# Patient Record
Sex: FEMALE | Race: NATIVE HAWAIIAN OR OTHER PACIFIC ISLANDER
[De-identification: names, ages, dates, MRNs, and addresses within clinical notes are randomized per-mention and may not be internally consistent; named-entity substitution may affect disease eponyms.]

---

## 2017-09-03 ENCOUNTER — HOSPITAL ENCOUNTER (EMERGENCY)
Dept: HOSPITAL 67 - ED | Age: 62
Discharge: HOME | End: 2017-09-03
Payer: COMMERCIAL

## 2017-09-03 ENCOUNTER — HOSPITAL ENCOUNTER (OUTPATIENT)
Dept: HOSPITAL 67 - AMB | Age: 62
Discharge: ANOTHER HOSPITAL | End: 2017-09-03
Payer: COMMERCIAL

## 2017-09-03 VITALS — SYSTOLIC BLOOD PRESSURE: 127 MMHG | DIASTOLIC BLOOD PRESSURE: 71 MMHG

## 2017-09-03 VITALS — HEIGHT: 67 IN | BODY MASS INDEX: 16.95 KG/M2 | TEMPERATURE: 98 F | WEIGHT: 108 LBS

## 2017-09-03 DIAGNOSIS — G40.89: Primary | ICD-10-CM

## 2017-09-03 DIAGNOSIS — I69.398: ICD-10-CM

## 2017-09-03 DIAGNOSIS — R56.9: Primary | ICD-10-CM

## 2017-09-03 DIAGNOSIS — G93.89: ICD-10-CM

## 2017-09-03 LAB
PLATELET # BLD: 232 K/UL (ref 152–353)
POTASSIUM SERPL-SCNC: 3.4 MMOL/L (ref 3.6–5.2)
SODIUM SERPL-SCNC: 140 MMOL/L (ref 136–145)

## 2017-09-03 PROCEDURE — 36415 COLL VENOUS BLD VENIPUNCTURE: CPT

## 2017-09-03 PROCEDURE — A0425 GROUND MILEAGE: HCPCS

## 2017-09-03 PROCEDURE — 80053 COMPREHEN METABOLIC PANEL: CPT

## 2017-09-03 PROCEDURE — 85027 COMPLETE CBC AUTOMATED: CPT

## 2017-09-03 PROCEDURE — A0429 BLS-EMERGENCY: HCPCS

## 2017-09-03 PROCEDURE — 99283 EMERGENCY DEPT VISIT LOW MDM: CPT

## 2017-09-05 ENCOUNTER — HOSPITAL ENCOUNTER (OUTPATIENT)
Dept: HOSPITAL 67 - AMB | Age: 62
Discharge: ANOTHER HOSPITAL | End: 2017-09-05
Payer: COMMERCIAL

## 2017-09-05 DIAGNOSIS — R19.7: ICD-10-CM

## 2017-09-05 DIAGNOSIS — R11.2: Primary | ICD-10-CM

## 2017-09-05 PROCEDURE — A0425 GROUND MILEAGE: HCPCS

## 2017-09-05 PROCEDURE — A0429 BLS-EMERGENCY: HCPCS

## 2017-09-06 ENCOUNTER — HOSPITAL ENCOUNTER (OUTPATIENT)
Dept: HOSPITAL 67 - AMB | Age: 62
Discharge: ANOTHER HOSPITAL | End: 2017-09-06
Payer: COMMERCIAL

## 2017-09-06 DIAGNOSIS — R11.2: Primary | ICD-10-CM

## 2017-09-06 DIAGNOSIS — R53.1: ICD-10-CM

## 2017-09-06 PROCEDURE — A0427 ALS1-EMERGENCY: HCPCS

## 2017-09-06 PROCEDURE — A0425 GROUND MILEAGE: HCPCS

## 2017-10-11 ENCOUNTER — HOSPITAL ENCOUNTER (OUTPATIENT)
Dept: HOSPITAL 67 - LAB | Age: 62
Discharge: HOME | End: 2017-10-11
Attending: INTERNAL MEDICINE
Payer: COMMERCIAL

## 2017-10-11 DIAGNOSIS — N39.0: Primary | ICD-10-CM

## 2017-10-11 PROCEDURE — 81000 URINALYSIS NONAUTO W/SCOPE: CPT

## 2017-10-11 PROCEDURE — 87088 URINE BACTERIA CULTURE: CPT

## 2017-10-11 PROCEDURE — 87077 CULTURE AEROBIC IDENTIFY: CPT

## 2017-10-11 PROCEDURE — 87086 URINE CULTURE/COLONY COUNT: CPT

## 2017-10-11 PROCEDURE — 87186 SC STD MICRODIL/AGAR DIL: CPT

## 2018-01-19 ENCOUNTER — HOSPITAL ENCOUNTER (EMERGENCY)
Dept: HOSPITAL 67 - ED | Age: 63
Discharge: HOME | End: 2018-01-19
Payer: COMMERCIAL

## 2018-01-19 ENCOUNTER — HOSPITAL ENCOUNTER (OUTPATIENT)
Dept: HOSPITAL 67 - AMB | Age: 63
Discharge: ANOTHER HOSPITAL | End: 2018-01-19
Payer: COMMERCIAL

## 2018-01-19 VITALS — BODY MASS INDEX: 26.68 KG/M2 | WEIGHT: 170 LBS | HEIGHT: 67 IN

## 2018-01-19 VITALS — DIASTOLIC BLOOD PRESSURE: 80 MMHG | SYSTOLIC BLOOD PRESSURE: 136 MMHG

## 2018-01-19 VITALS — TEMPERATURE: 98 F

## 2018-01-19 DIAGNOSIS — I73.89: Primary | ICD-10-CM

## 2018-01-19 DIAGNOSIS — L08.89: ICD-10-CM

## 2018-01-19 DIAGNOSIS — L97.528: ICD-10-CM

## 2018-01-19 DIAGNOSIS — D63.8: ICD-10-CM

## 2018-01-19 DIAGNOSIS — E88.09: ICD-10-CM

## 2018-01-19 DIAGNOSIS — I10: ICD-10-CM

## 2018-01-19 DIAGNOSIS — E87.6: ICD-10-CM

## 2018-01-19 DIAGNOSIS — R60.0: Primary | ICD-10-CM

## 2018-01-19 LAB
PLATELET # BLD: 209 K/UL (ref 152–353)
POTASSIUM SERPL-SCNC: 3.2 MMOL/L (ref 3.6–5.2)
SODIUM SERPL-SCNC: 133 MMOL/L (ref 136–145)

## 2018-01-19 PROCEDURE — 87205 SMEAR GRAM STAIN: CPT

## 2018-01-19 PROCEDURE — 85027 COMPLETE CBC AUTOMATED: CPT

## 2018-01-19 PROCEDURE — 82550 ASSAY OF CK (CPK): CPT

## 2018-01-19 PROCEDURE — 87186 SC STD MICRODIL/AGAR DIL: CPT

## 2018-01-19 PROCEDURE — A0427 ALS1-EMERGENCY: HCPCS

## 2018-01-19 PROCEDURE — 81000 URINALYSIS NONAUTO W/SCOPE: CPT

## 2018-01-19 PROCEDURE — 80184 ASSAY OF PHENOBARBITAL: CPT

## 2018-01-19 PROCEDURE — 84484 ASSAY OF TROPONIN QUANT: CPT

## 2018-01-19 PROCEDURE — A0425 GROUND MILEAGE: HCPCS

## 2018-01-19 PROCEDURE — 87077 CULTURE AEROBIC IDENTIFY: CPT

## 2018-01-19 PROCEDURE — 36415 COLL VENOUS BLD VENIPUNCTURE: CPT

## 2018-01-19 PROCEDURE — 87070 CULTURE OTHR SPECIMN AEROBIC: CPT

## 2018-01-19 PROCEDURE — 80053 COMPREHEN METABOLIC PANEL: CPT

## 2018-01-19 PROCEDURE — 80156 ASSAY CARBAMAZEPINE TOTAL: CPT

## 2018-01-19 PROCEDURE — 99283 EMERGENCY DEPT VISIT LOW MDM: CPT

## 2018-01-21 ENCOUNTER — HOSPITAL ENCOUNTER (EMERGENCY)
Dept: HOSPITAL 67 - ED | Age: 63
Discharge: HOME | End: 2018-01-21
Payer: COMMERCIAL

## 2018-01-21 ENCOUNTER — HOSPITAL ENCOUNTER (OUTPATIENT)
Dept: HOSPITAL 67 - AMB | Age: 63
Discharge: ANOTHER HOSPITAL | End: 2018-01-21
Payer: COMMERCIAL

## 2018-01-21 VITALS — TEMPERATURE: 98.2 F | SYSTOLIC BLOOD PRESSURE: 158 MMHG | DIASTOLIC BLOOD PRESSURE: 84 MMHG

## 2018-01-21 VITALS — HEIGHT: 67 IN | WEIGHT: 160 LBS | BODY MASS INDEX: 25.11 KG/M2

## 2018-01-21 DIAGNOSIS — Z87.81: ICD-10-CM

## 2018-01-21 DIAGNOSIS — R53.1: ICD-10-CM

## 2018-01-21 DIAGNOSIS — S90.31XA: ICD-10-CM

## 2018-01-21 DIAGNOSIS — M79.671: ICD-10-CM

## 2018-01-21 DIAGNOSIS — L03.116: ICD-10-CM

## 2018-01-21 DIAGNOSIS — S90.32XA: Primary | ICD-10-CM
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
## 2018-01-21 DIAGNOSIS — M79.672: Primary | ICD-10-CM
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
## 2018-01-21 PROCEDURE — A0425 GROUND MILEAGE: HCPCS

## 2018-01-21 PROCEDURE — A0429 BLS-EMERGENCY: HCPCS

## 2018-01-21 PROCEDURE — 99282 EMERGENCY DEPT VISIT SF MDM: CPT

## 2018-02-07 ENCOUNTER — HOSPITAL ENCOUNTER (OUTPATIENT)
Dept: HOSPITAL 67 - AMB | Age: 63
Discharge: ANOTHER HOSPITAL | End: 2018-02-07
Payer: COMMERCIAL

## 2018-02-07 DIAGNOSIS — R56.9: Primary | ICD-10-CM

## 2018-02-07 PROCEDURE — A0427 ALS1-EMERGENCY: HCPCS

## 2018-02-07 PROCEDURE — A0425 GROUND MILEAGE: HCPCS

## 2018-02-08 ENCOUNTER — HOSPITAL ENCOUNTER (OUTPATIENT)
Dept: HOSPITAL 67 - ED | Age: 63
Setting detail: OBSERVATION
LOS: 1 days | Discharge: HOME | End: 2018-02-09
Attending: FAMILY MEDICINE | Admitting: GENERAL PRACTICE
Payer: COMMERCIAL

## 2018-02-08 VITALS — TEMPERATURE: 98 F | SYSTOLIC BLOOD PRESSURE: 125 MMHG | DIASTOLIC BLOOD PRESSURE: 74 MMHG

## 2018-02-08 VITALS
BODY MASS INDEX: 22.77 KG/M2 | HEIGHT: 67 IN | WEIGHT: 145.06 LBS | HEIGHT: 67 IN | WEIGHT: 145.06 LBS | BODY MASS INDEX: 22.77 KG/M2

## 2018-02-08 VITALS — SYSTOLIC BLOOD PRESSURE: 180 MMHG | DIASTOLIC BLOOD PRESSURE: 98 MMHG | TEMPERATURE: 97.8 F

## 2018-02-08 VITALS — DIASTOLIC BLOOD PRESSURE: 68 MMHG | SYSTOLIC BLOOD PRESSURE: 123 MMHG | TEMPERATURE: 98.4 F

## 2018-02-08 VITALS — DIASTOLIC BLOOD PRESSURE: 70 MMHG | TEMPERATURE: 97.5 F | SYSTOLIC BLOOD PRESSURE: 142 MMHG

## 2018-02-08 VITALS — SYSTOLIC BLOOD PRESSURE: 127 MMHG | DIASTOLIC BLOOD PRESSURE: 57 MMHG | TEMPERATURE: 97.9 F

## 2018-02-08 VITALS — TEMPERATURE: 97.6 F | DIASTOLIC BLOOD PRESSURE: 83 MMHG | SYSTOLIC BLOOD PRESSURE: 174 MMHG

## 2018-02-08 VITALS — DIASTOLIC BLOOD PRESSURE: 87 MMHG | TEMPERATURE: 97.4 F | SYSTOLIC BLOOD PRESSURE: 134 MMHG

## 2018-02-08 DIAGNOSIS — I12.9: ICD-10-CM

## 2018-02-08 DIAGNOSIS — E87.6: ICD-10-CM

## 2018-02-08 DIAGNOSIS — Z86.73: ICD-10-CM

## 2018-02-08 DIAGNOSIS — N18.3: ICD-10-CM

## 2018-02-08 DIAGNOSIS — E83.51: ICD-10-CM

## 2018-02-08 DIAGNOSIS — R74.8: ICD-10-CM

## 2018-02-08 DIAGNOSIS — E87.1: ICD-10-CM

## 2018-02-08 DIAGNOSIS — G40.89: Primary | ICD-10-CM

## 2018-02-08 LAB
PLATELET # BLD: 274 K/UL (ref 152–353)
PLATELET # BLD: 304 K/UL (ref 152–353)
POTASSIUM SERPL-SCNC: 3.2 MMOL/L (ref 3.6–5.2)
POTASSIUM SERPL-SCNC: 4.5 MMOL/L (ref 3.6–5.2)
SODIUM SERPL-SCNC: 134 MMOL/L (ref 136–145)
SODIUM SERPL-SCNC: 139 MMOL/L (ref 136–145)

## 2018-02-08 PROCEDURE — 80156 ASSAY CARBAMAZEPINE TOTAL: CPT

## 2018-02-08 PROCEDURE — 96366 THER/PROPH/DIAG IV INF ADDON: CPT

## 2018-02-08 PROCEDURE — 96375 TX/PRO/DX INJ NEW DRUG ADDON: CPT

## 2018-02-08 PROCEDURE — 80053 COMPREHEN METABOLIC PANEL: CPT

## 2018-02-08 PROCEDURE — 99284 EMERGENCY DEPT VISIT MOD MDM: CPT

## 2018-02-08 PROCEDURE — 99220: CPT

## 2018-02-08 PROCEDURE — 96365 THER/PROPH/DIAG IV INF INIT: CPT

## 2018-02-08 PROCEDURE — 85027 COMPLETE CBC AUTOMATED: CPT

## 2018-02-08 PROCEDURE — 80184 ASSAY OF PHENOBARBITAL: CPT

## 2018-02-08 PROCEDURE — G0378 HOSPITAL OBSERVATION PER HR: HCPCS

## 2018-02-08 PROCEDURE — 96372 THER/PROPH/DIAG INJ SC/IM: CPT

## 2018-02-08 PROCEDURE — 36416 COLLJ CAPILLARY BLOOD SPEC: CPT

## 2018-02-08 PROCEDURE — 94760 N-INVAS EAR/PLS OXIMETRY 1: CPT

## 2018-02-08 PROCEDURE — 36415 COLL VENOUS BLD VENIPUNCTURE: CPT

## 2018-02-08 PROCEDURE — 96360 HYDRATION IV INFUSION INIT: CPT

## 2018-02-09 VITALS — TEMPERATURE: 99.2 F | DIASTOLIC BLOOD PRESSURE: 70 MMHG | SYSTOLIC BLOOD PRESSURE: 139 MMHG

## 2018-02-09 VITALS — TEMPERATURE: 98 F | SYSTOLIC BLOOD PRESSURE: 123 MMHG | DIASTOLIC BLOOD PRESSURE: 75 MMHG

## 2018-02-09 VITALS — TEMPERATURE: 97.9 F | SYSTOLIC BLOOD PRESSURE: 111 MMHG | DIASTOLIC BLOOD PRESSURE: 51 MMHG

## 2018-02-09 LAB
PLATELET # BLD: 269 K/UL (ref 152–353)
POTASSIUM SERPL-SCNC: 3.5 MMOL/L (ref 3.6–5.2)
SODIUM SERPL-SCNC: 137 MMOL/L (ref 136–145)

## 2018-03-05 ENCOUNTER — HOSPITAL ENCOUNTER (OUTPATIENT)
Dept: HOSPITAL 67 - ED | Age: 63
Setting detail: OBSERVATION
LOS: 1 days | Discharge: HOME | End: 2018-03-06
Payer: COMMERCIAL

## 2018-03-05 VITALS — SYSTOLIC BLOOD PRESSURE: 105 MMHG | TEMPERATURE: 97.7 F | DIASTOLIC BLOOD PRESSURE: 59 MMHG

## 2018-03-05 VITALS — DIASTOLIC BLOOD PRESSURE: 64 MMHG | SYSTOLIC BLOOD PRESSURE: 122 MMHG

## 2018-03-05 VITALS
WEIGHT: 143.5 LBS | HEIGHT: 67 IN | HEIGHT: 67 IN | BODY MASS INDEX: 22.52 KG/M2 | WEIGHT: 143.5 LBS | BODY MASS INDEX: 22.52 KG/M2

## 2018-03-05 VITALS — TEMPERATURE: 97.8 F | SYSTOLIC BLOOD PRESSURE: 142 MMHG | DIASTOLIC BLOOD PRESSURE: 73 MMHG

## 2018-03-05 VITALS — DIASTOLIC BLOOD PRESSURE: 65 MMHG | SYSTOLIC BLOOD PRESSURE: 120 MMHG | TEMPERATURE: 97.8 F

## 2018-03-05 VITALS — DIASTOLIC BLOOD PRESSURE: 78 MMHG | SYSTOLIC BLOOD PRESSURE: 132 MMHG

## 2018-03-05 VITALS — DIASTOLIC BLOOD PRESSURE: 63 MMHG | TEMPERATURE: 98 F | SYSTOLIC BLOOD PRESSURE: 122 MMHG

## 2018-03-05 VITALS — DIASTOLIC BLOOD PRESSURE: 74 MMHG | SYSTOLIC BLOOD PRESSURE: 128 MMHG

## 2018-03-05 DIAGNOSIS — S00.03XA: Primary | ICD-10-CM
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
## 2018-03-05 DIAGNOSIS — I10: ICD-10-CM
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
## 2018-03-05 DIAGNOSIS — K21.9: ICD-10-CM
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
## 2018-03-05 DIAGNOSIS — I69.854: ICD-10-CM

## 2018-03-05 DIAGNOSIS — G40.802: ICD-10-CM

## 2018-03-05 LAB
PLATELET # BLD: 192 K/UL (ref 152–353)
POTASSIUM SERPL-SCNC: 4 MMOL/L (ref 3.6–5.2)
SODIUM SERPL-SCNC: 135 MMOL/L (ref 136–145)

## 2018-03-05 PROCEDURE — 80053 COMPREHEN METABOLIC PANEL: CPT

## 2018-03-05 PROCEDURE — 85027 COMPLETE CBC AUTOMATED: CPT

## 2018-03-05 PROCEDURE — 99283 EMERGENCY DEPT VISIT LOW MDM: CPT

## 2018-03-05 PROCEDURE — 36415 COLL VENOUS BLD VENIPUNCTURE: CPT

## 2018-03-05 PROCEDURE — 99220: CPT

## 2018-03-05 PROCEDURE — 80048 BASIC METABOLIC PNL TOTAL CA: CPT

## 2018-03-05 PROCEDURE — 96374 THER/PROPH/DIAG INJ IV PUSH: CPT

## 2018-03-05 PROCEDURE — G0378 HOSPITAL OBSERVATION PER HR: HCPCS

## 2018-03-05 PROCEDURE — 96375 TX/PRO/DX INJ NEW DRUG ADDON: CPT

## 2018-03-06 VITALS — DIASTOLIC BLOOD PRESSURE: 77 MMHG | SYSTOLIC BLOOD PRESSURE: 142 MMHG | TEMPERATURE: 98.4 F

## 2018-03-06 VITALS — DIASTOLIC BLOOD PRESSURE: 57 MMHG | TEMPERATURE: 97.5 F | SYSTOLIC BLOOD PRESSURE: 121 MMHG

## 2018-03-06 LAB
PLATELET # BLD: 183 K/UL (ref 152–353)
POTASSIUM SERPL-SCNC: 3.6 MMOL/L (ref 3.6–5.2)
SODIUM SERPL-SCNC: 133 MMOL/L (ref 136–145)

## 2018-03-27 ENCOUNTER — HOSPITAL ENCOUNTER (OUTPATIENT)
Dept: HOSPITAL 67 - ED | Age: 63
Setting detail: OBSERVATION
LOS: 2 days | Discharge: HOME | End: 2018-03-29
Attending: INTERNAL MEDICINE | Admitting: GENERAL PRACTICE
Payer: COMMERCIAL

## 2018-03-27 ENCOUNTER — HOSPITAL ENCOUNTER (OUTPATIENT)
Dept: HOSPITAL 67 - AMB | Age: 63
Discharge: ANOTHER HOSPITAL | End: 2018-03-27
Payer: COMMERCIAL

## 2018-03-27 VITALS — SYSTOLIC BLOOD PRESSURE: 149 MMHG | DIASTOLIC BLOOD PRESSURE: 68 MMHG | TEMPERATURE: 98.6 F

## 2018-03-27 VITALS
WEIGHT: 149.44 LBS | HEIGHT: 67 IN | BODY MASS INDEX: 23.45 KG/M2 | WEIGHT: 149.44 LBS | BODY MASS INDEX: 23.45 KG/M2 | HEIGHT: 67 IN

## 2018-03-27 DIAGNOSIS — N10: Primary | ICD-10-CM

## 2018-03-27 DIAGNOSIS — R51: ICD-10-CM
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
## 2018-03-27 DIAGNOSIS — R93.5: ICD-10-CM

## 2018-03-27 DIAGNOSIS — R11.10: ICD-10-CM

## 2018-03-27 DIAGNOSIS — E78.4: ICD-10-CM

## 2018-03-27 DIAGNOSIS — B96.20: ICD-10-CM

## 2018-03-27 DIAGNOSIS — D64.89: ICD-10-CM

## 2018-03-27 DIAGNOSIS — G62.9: ICD-10-CM
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
## 2018-03-27 DIAGNOSIS — I10: ICD-10-CM

## 2018-03-27 DIAGNOSIS — R30.0: ICD-10-CM

## 2018-03-27 DIAGNOSIS — K21.9: ICD-10-CM

## 2018-03-27 DIAGNOSIS — R51: Primary | ICD-10-CM

## 2018-03-27 DIAGNOSIS — R33.9: ICD-10-CM

## 2018-03-27 PROCEDURE — 87186 SC STD MICRODIL/AGAR DIL: CPT

## 2018-03-27 PROCEDURE — 51702 INSERT TEMP BLADDER CATH: CPT

## 2018-03-27 PROCEDURE — 99220: CPT

## 2018-03-27 PROCEDURE — 96365 THER/PROPH/DIAG IV INF INIT: CPT

## 2018-03-27 PROCEDURE — 99284 EMERGENCY DEPT VISIT MOD MDM: CPT

## 2018-03-27 PROCEDURE — 80053 COMPREHEN METABOLIC PANEL: CPT

## 2018-03-27 PROCEDURE — 87077 CULTURE AEROBIC IDENTIFY: CPT

## 2018-03-27 PROCEDURE — 36415 COLL VENOUS BLD VENIPUNCTURE: CPT

## 2018-03-27 PROCEDURE — 87088 URINE BACTERIA CULTURE: CPT

## 2018-03-27 PROCEDURE — 96372 THER/PROPH/DIAG INJ SC/IM: CPT

## 2018-03-27 PROCEDURE — A0425 GROUND MILEAGE: HCPCS

## 2018-03-27 PROCEDURE — G0378 HOSPITAL OBSERVATION PER HR: HCPCS

## 2018-03-27 PROCEDURE — 87086 URINE CULTURE/COLONY COUNT: CPT

## 2018-03-27 PROCEDURE — 81000 URINALYSIS NONAUTO W/SCOPE: CPT

## 2018-03-27 PROCEDURE — 87040 BLOOD CULTURE FOR BACTERIA: CPT

## 2018-03-27 PROCEDURE — A0427 ALS1-EMERGENCY: HCPCS

## 2018-03-27 PROCEDURE — 85027 COMPLETE CBC AUTOMATED: CPT

## 2018-03-28 VITALS — TEMPERATURE: 97.7 F | DIASTOLIC BLOOD PRESSURE: 42 MMHG | SYSTOLIC BLOOD PRESSURE: 99 MMHG

## 2018-03-28 VITALS — SYSTOLIC BLOOD PRESSURE: 192 MMHG | DIASTOLIC BLOOD PRESSURE: 88 MMHG | TEMPERATURE: 97 F

## 2018-03-28 VITALS — DIASTOLIC BLOOD PRESSURE: 52 MMHG | SYSTOLIC BLOOD PRESSURE: 102 MMHG | TEMPERATURE: 97.7 F

## 2018-03-28 VITALS — SYSTOLIC BLOOD PRESSURE: 110 MMHG | DIASTOLIC BLOOD PRESSURE: 52 MMHG | TEMPERATURE: 98 F

## 2018-03-28 VITALS — DIASTOLIC BLOOD PRESSURE: 91 MMHG | SYSTOLIC BLOOD PRESSURE: 169 MMHG | TEMPERATURE: 97.4 F

## 2018-03-28 LAB
PLATELET # BLD: 187 K/UL (ref 152–353)
PLATELET # BLD: 198 K/UL (ref 152–353)
POTASSIUM SERPL-SCNC: 3.6 MMOL/L (ref 3.6–5.2)
POTASSIUM SERPL-SCNC: 3.6 MMOL/L (ref 3.6–5.2)
SODIUM SERPL-SCNC: 133 MMOL/L (ref 136–145)
SODIUM SERPL-SCNC: 136 MMOL/L (ref 136–145)

## 2018-03-29 VITALS — DIASTOLIC BLOOD PRESSURE: 55 MMHG | TEMPERATURE: 97.9 F | SYSTOLIC BLOOD PRESSURE: 104 MMHG

## 2018-03-29 VITALS — SYSTOLIC BLOOD PRESSURE: 112 MMHG | DIASTOLIC BLOOD PRESSURE: 57 MMHG | TEMPERATURE: 98.2 F

## 2018-03-29 VITALS — SYSTOLIC BLOOD PRESSURE: 119 MMHG | TEMPERATURE: 97.8 F | DIASTOLIC BLOOD PRESSURE: 43 MMHG

## 2018-03-29 LAB
PLATELET # BLD: 166 K/UL (ref 152–353)
POTASSIUM SERPL-SCNC: 3.8 MMOL/L (ref 3.6–5.2)
SODIUM SERPL-SCNC: 134 MMOL/L (ref 136–145)

## 2018-07-26 ENCOUNTER — HOSPITAL ENCOUNTER (OUTPATIENT)
Dept: HOSPITAL 67 - ED | Age: 63
Setting detail: OBSERVATION
LOS: 1 days | Discharge: HOME | End: 2018-07-27
Attending: INTERNAL MEDICINE | Admitting: INTERNAL MEDICINE
Payer: COMMERCIAL

## 2018-07-26 ENCOUNTER — HOSPITAL ENCOUNTER (OUTPATIENT)
Dept: HOSPITAL 67 - AMB | Age: 63
Discharge: ANOTHER HOSPITAL | End: 2018-07-26
Payer: COMMERCIAL

## 2018-07-26 VITALS — SYSTOLIC BLOOD PRESSURE: 172 MMHG | DIASTOLIC BLOOD PRESSURE: 86 MMHG | TEMPERATURE: 98.8 F

## 2018-07-26 VITALS — DIASTOLIC BLOOD PRESSURE: 80 MMHG | TEMPERATURE: 98.2 F | SYSTOLIC BLOOD PRESSURE: 163 MMHG

## 2018-07-26 VITALS — TEMPERATURE: 97.9 F | SYSTOLIC BLOOD PRESSURE: 126 MMHG | DIASTOLIC BLOOD PRESSURE: 77 MMHG

## 2018-07-26 VITALS
BODY MASS INDEX: 23 KG/M2 | HEIGHT: 67 IN | WEIGHT: 146.56 LBS | HEIGHT: 67 IN | BODY MASS INDEX: 23 KG/M2 | WEIGHT: 146.56 LBS

## 2018-07-26 VITALS — SYSTOLIC BLOOD PRESSURE: 148 MMHG | TEMPERATURE: 97.4 F | DIASTOLIC BLOOD PRESSURE: 65 MMHG

## 2018-07-26 DIAGNOSIS — K21.9: ICD-10-CM

## 2018-07-26 DIAGNOSIS — R47.81: Primary | ICD-10-CM

## 2018-07-26 DIAGNOSIS — R53.1: ICD-10-CM

## 2018-07-26 DIAGNOSIS — I10: ICD-10-CM

## 2018-07-26 DIAGNOSIS — E78.4: ICD-10-CM

## 2018-07-26 DIAGNOSIS — N39.0: ICD-10-CM

## 2018-07-26 DIAGNOSIS — Z86.73: ICD-10-CM

## 2018-07-26 DIAGNOSIS — I69.354: ICD-10-CM

## 2018-07-26 DIAGNOSIS — G40.802: ICD-10-CM

## 2018-07-26 DIAGNOSIS — R41.82: Primary | ICD-10-CM

## 2018-07-26 DIAGNOSIS — R47.81: ICD-10-CM

## 2018-07-26 DIAGNOSIS — I73.89: ICD-10-CM

## 2018-07-26 LAB
APTT BLD: 76.3 SECONDS (ref 24.5–33.6)
PLATELET # BLD: 219 K/UL (ref 152–353)
POTASSIUM SERPL-SCNC: 3.5 MMOL/L (ref 3.6–5.2)
SODIUM SERPL-SCNC: 137 MMOL/L (ref 136–145)

## 2018-07-26 PROCEDURE — 80053 COMPREHEN METABOLIC PANEL: CPT

## 2018-07-26 PROCEDURE — 87186 SC STD MICRODIL/AGAR DIL: CPT

## 2018-07-26 PROCEDURE — G0378 HOSPITAL OBSERVATION PER HR: HCPCS

## 2018-07-26 PROCEDURE — 87086 URINE CULTURE/COLONY COUNT: CPT

## 2018-07-26 PROCEDURE — A0427 ALS1-EMERGENCY: HCPCS

## 2018-07-26 PROCEDURE — 87185 SC STD ENZYME DETCJ PER NZM: CPT

## 2018-07-26 PROCEDURE — 85730 THROMBOPLASTIN TIME PARTIAL: CPT

## 2018-07-26 PROCEDURE — 81000 URINALYSIS NONAUTO W/SCOPE: CPT

## 2018-07-26 PROCEDURE — 99284 EMERGENCY DEPT VISIT MOD MDM: CPT

## 2018-07-26 PROCEDURE — 80320 DRUG SCREEN QUANTALCOHOLS: CPT

## 2018-07-26 PROCEDURE — 87077 CULTURE AEROBIC IDENTIFY: CPT

## 2018-07-26 PROCEDURE — 87088 URINE BACTERIA CULTURE: CPT

## 2018-07-26 PROCEDURE — 93005 ELECTROCARDIOGRAM TRACING: CPT

## 2018-07-26 PROCEDURE — 85027 COMPLETE CBC AUTOMATED: CPT

## 2018-07-26 PROCEDURE — A0425 GROUND MILEAGE: HCPCS

## 2018-07-26 PROCEDURE — 85610 PROTHROMBIN TIME: CPT

## 2018-07-26 PROCEDURE — 99220: CPT

## 2018-07-26 PROCEDURE — 80307 DRUG TEST PRSMV CHEM ANLYZR: CPT

## 2018-07-27 VITALS — DIASTOLIC BLOOD PRESSURE: 71 MMHG | SYSTOLIC BLOOD PRESSURE: 133 MMHG | TEMPERATURE: 98.7 F

## 2018-07-27 VITALS — DIASTOLIC BLOOD PRESSURE: 60 MMHG | SYSTOLIC BLOOD PRESSURE: 121 MMHG | TEMPERATURE: 97.9 F

## 2018-07-27 VITALS — DIASTOLIC BLOOD PRESSURE: 67 MMHG | TEMPERATURE: 98.7 F | SYSTOLIC BLOOD PRESSURE: 163 MMHG

## 2018-07-27 VITALS — DIASTOLIC BLOOD PRESSURE: 68 MMHG | TEMPERATURE: 98.6 F | SYSTOLIC BLOOD PRESSURE: 171 MMHG

## 2018-07-27 LAB
APTT BLD: 70 SECONDS (ref 24.5–33.6)
PLATELET # BLD: 176 K/UL (ref 152–353)
POTASSIUM SERPL-SCNC: 3.3 MMOL/L (ref 3.6–5.2)
SODIUM SERPL-SCNC: 134 MMOL/L (ref 136–145)

## 2018-07-27 NOTE — NUR
IV D/C'D PER DOCTORS ORDERS. DISCHARGE INSTRUCTIONS GIVEN. PT AND PTS SPOUSE
UNDERSTANDS ALL DISCHARGE INSTRUCTIONS. DR. BOYCE'S OFFICE WAS CLOSED BUT
INFORMED PT/PTS SPOUSE TO CALL AND SCHEDULE AN APPOINTMENT FOR FOLLOW UP. PT
LEFT VIA WC. SPOUSE AT SIDE. NAD NOTED.

## 2018-07-30 ENCOUNTER — HOSPITAL ENCOUNTER (OUTPATIENT)
Dept: HOSPITAL 67 - AMB | Age: 63
Discharge: ANOTHER HOSPITAL | End: 2018-07-30
Payer: COMMERCIAL

## 2018-07-30 ENCOUNTER — HOSPITAL ENCOUNTER (EMERGENCY)
Dept: HOSPITAL 67 - ED | Age: 63
LOS: 1 days | Discharge: HOME | End: 2018-07-31
Payer: COMMERCIAL

## 2018-07-30 VITALS — BODY MASS INDEX: 22.91 KG/M2 | HEIGHT: 67 IN | WEIGHT: 146 LBS

## 2018-07-30 DIAGNOSIS — M25.512: ICD-10-CM
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
## 2018-07-30 DIAGNOSIS — S42.212A: Primary | ICD-10-CM

## 2018-07-30 DIAGNOSIS — M25.512: Primary | ICD-10-CM
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
## 2018-07-30 PROCEDURE — A0429 BLS-EMERGENCY: HCPCS

## 2018-07-30 PROCEDURE — 99283 EMERGENCY DEPT VISIT LOW MDM: CPT

## 2018-07-30 PROCEDURE — 2W39X1Z IMMOBILIZATION OF LEFT UPPER EXTREMITY USING SPLINT: ICD-10-PCS | Performed by: INTERNAL MEDICINE

## 2018-07-30 PROCEDURE — A0425 GROUND MILEAGE: HCPCS

## 2018-07-31 VITALS — DIASTOLIC BLOOD PRESSURE: 68 MMHG | TEMPERATURE: 98.2 F | SYSTOLIC BLOOD PRESSURE: 140 MMHG
